# Patient Record
Sex: FEMALE | Race: WHITE
[De-identification: names, ages, dates, MRNs, and addresses within clinical notes are randomized per-mention and may not be internally consistent; named-entity substitution may affect disease eponyms.]

---

## 2020-04-29 ENCOUNTER — HOSPITAL ENCOUNTER (OUTPATIENT)
Dept: HOSPITAL 62 - OD | Age: 40
End: 2020-04-29
Attending: NURSE PRACTITIONER
Payer: COMMERCIAL

## 2020-04-29 DIAGNOSIS — F32.1: ICD-10-CM

## 2020-04-29 DIAGNOSIS — R63.4: ICD-10-CM

## 2020-04-29 DIAGNOSIS — N93.9: Primary | ICD-10-CM

## 2020-04-29 LAB
ADD MANUAL DIFF: NO
ALBUMIN SERPL-MCNC: 4.2 G/DL (ref 3.5–5)
ALP SERPL-CCNC: 68 U/L (ref 38–126)
ANION GAP SERPL CALC-SCNC: 5 MMOL/L (ref 5–19)
AST SERPL-CCNC: 24 U/L (ref 14–36)
BASOPHILS # BLD AUTO: 0 10^3/UL (ref 0–0.2)
BASOPHILS NFR BLD AUTO: 0.3 % (ref 0–2)
BILIRUB DIRECT SERPL-MCNC: 0 MG/DL (ref 0–0.4)
BILIRUB SERPL-MCNC: 0.6 MG/DL (ref 0.2–1.3)
BUN SERPL-MCNC: 15 MG/DL (ref 7–20)
CALCIUM: 9.4 MG/DL (ref 8.4–10.2)
CHLORIDE SERPL-SCNC: 103 MMOL/L (ref 98–107)
CO2 SERPL-SCNC: 29 MMOL/L (ref 22–30)
EOSINOPHIL # BLD AUTO: 0.1 10^3/UL (ref 0–0.6)
EOSINOPHIL NFR BLD AUTO: 0.5 % (ref 0–6)
ERYTHROCYTE [DISTWIDTH] IN BLOOD BY AUTOMATED COUNT: 12.9 % (ref 11.5–14)
ERYTHROCYTE [SEDIMENTATION RATE] IN BLOOD: 11 MM/HR (ref 0–20)
GLUCOSE SERPL-MCNC: 78 MG/DL (ref 75–110)
HCT VFR BLD CALC: 40.3 % (ref 36–47)
HGB BLD-MCNC: 14.1 G/DL (ref 12–15.5)
LYMPHOCYTES # BLD AUTO: 3.1 10^3/UL (ref 0.5–4.7)
LYMPHOCYTES NFR BLD AUTO: 22.2 % (ref 13–45)
MCH RBC QN AUTO: 30.8 PG (ref 27–33.4)
MCHC RBC AUTO-ENTMCNC: 35 G/DL (ref 32–36)
MCV RBC AUTO: 88 FL (ref 80–97)
MONOCYTES # BLD AUTO: 0.8 10^3/UL (ref 0.1–1.4)
MONOCYTES NFR BLD AUTO: 5.5 % (ref 3–13)
NEUTROPHILS # BLD AUTO: 9.9 10^3/UL (ref 1.7–8.2)
NEUTS SEG NFR BLD AUTO: 71.5 % (ref 42–78)
PLATELET # BLD: 303 10^3/UL (ref 150–450)
POTASSIUM SERPL-SCNC: 4.4 MMOL/L (ref 3.6–5)
PROT SERPL-MCNC: 6.7 G/DL (ref 6.3–8.2)
RBC # BLD AUTO: 4.57 10^6/UL (ref 3.72–5.28)
TOTAL CELLS COUNTED % (AUTO): 100 %
WBC # BLD AUTO: 13.9 10^3/UL (ref 4–10.5)

## 2020-04-29 PROCEDURE — 36415 COLL VENOUS BLD VENIPUNCTURE: CPT

## 2020-04-29 PROCEDURE — 84443 ASSAY THYROID STIM HORMONE: CPT

## 2020-04-29 PROCEDURE — 85652 RBC SED RATE AUTOMATED: CPT

## 2020-04-29 PROCEDURE — 80053 COMPREHEN METABOLIC PANEL: CPT

## 2020-04-29 PROCEDURE — 85025 COMPLETE CBC W/AUTO DIFF WBC: CPT

## 2020-05-04 ENCOUNTER — HOSPITAL ENCOUNTER (OUTPATIENT)
Dept: HOSPITAL 62 - RAD | Age: 40
End: 2020-05-04
Attending: NURSE PRACTITIONER
Payer: COMMERCIAL

## 2020-05-04 DIAGNOSIS — N83.292: ICD-10-CM

## 2020-05-04 DIAGNOSIS — N93.9: Primary | ICD-10-CM

## 2020-05-04 PROCEDURE — 76830 TRANSVAGINAL US NON-OB: CPT

## 2020-05-04 NOTE — RADIOLOGY REPORT (SQ)
EXAM DESCRIPTION:  U/S NON-OB PELVIS TV W/O DOP



IMAGES COMPLETED DATE/TIME:  5/4/2020 11:48 am



REASON FOR STUDY:  ABNORMAL UTERINE BLEEDING (N93.9) N93.9  ABNORMAL UTERINE AND VAGINAL BLEEDING, UN
SPECIFIED



COMPARISON:  None.



TECHNIQUE:  Dynamic and static grayscale images acquired of the pelvis via transvaginal approach and 
recorded on PACS. Additional selected color Doppler and spectral images recorded.



LIMITATIONS:  None.



FINDINGS:  LMP:  Unknown.

PREGNANCY STATUS:  Unknown.

UTERUS: The uterus measures 9.4 x 4.6 x 5.4 cm.  The echotexture of the myometrium is homogeneous.

ENDOMETRIAL STRIPE: The endometrium measures 16 mm in thickness.

CERVIX: The cervix measures 2.1 cm in length.

RIGHT OVARY AND DOPPLER: The right ovary measures 2 x 1.4 x 1.2 cm and on Doppler there is intact vas
cular flow within the ovarian stroma.  There is no adnexal mass.

LEFT OVARY AND DOPPLER:  The left ovary measures 3.7 x 3.6 x 2.7 cm and on Doppler there is intact va
scular flow within the ovarian stroma.  There is a 2.8 x 2.6 x 2 cm anechoic cyst in the left ovary.

OTHER: No other findings.



IMPRESSION:  1.  No abnormality of the uterus and endometrium.

2.  2.8 x 2.6 x 2 cm cyst in the left ovary.



TECHNICAL DOCUMENTATION:  JOB ID:  [id number]

 2011 [employer]- All Rights Reserved                          Rev-5/18



Reading location - IP/workstation name: [email or web address]